# Patient Record
Sex: MALE | Race: WHITE | NOT HISPANIC OR LATINO | Employment: OTHER | URBAN - METROPOLITAN AREA
[De-identification: names, ages, dates, MRNs, and addresses within clinical notes are randomized per-mention and may not be internally consistent; named-entity substitution may affect disease eponyms.]

---

## 2021-07-21 ENCOUNTER — TELEPHONE (OUTPATIENT)
Dept: OPTOMETRY | Age: 66
End: 2021-07-21

## 2021-07-21 ENCOUNTER — OFFICE VISIT (OUTPATIENT)
Dept: OPTOMETRY | Age: 66
End: 2021-07-21

## 2021-07-21 DIAGNOSIS — H52.223 REGULAR ASTIGMATISM OF BOTH EYES: ICD-10-CM

## 2021-07-21 DIAGNOSIS — H52.03 HYPERMETROPIA OF BOTH EYES: ICD-10-CM

## 2021-07-21 DIAGNOSIS — H25.13 NUCLEAR AGE-RELATED CATARACT, BOTH EYES: Primary | ICD-10-CM

## 2021-07-21 DIAGNOSIS — H52.4 PRESBYOPIA: ICD-10-CM

## 2021-07-21 PROCEDURE — 92015 DETERMINE REFRACTIVE STATE: CPT | Performed by: OPTOMETRIST

## 2021-07-21 PROCEDURE — 92004 COMPRE OPH EXAM NEW PT 1/>: CPT | Performed by: OPTOMETRIST

## 2021-07-21 RX ORDER — QUETIAPINE FUMARATE 50 MG/1
25 TABLET, FILM COATED ORAL
COMMUNITY

## 2021-07-21 RX ORDER — SIMVASTATIN 40 MG
40 TABLET ORAL DAILY
COMMUNITY
Start: 2021-06-15

## 2021-07-21 RX ORDER — CYCLOBENZAPRINE HCL 10 MG
TABLET ORAL
COMMUNITY
Start: 2021-07-12

## 2021-07-21 RX ORDER — LEVOTHYROXINE SODIUM 0.03 MG/1
25 TABLET ORAL
COMMUNITY

## 2021-07-21 RX ORDER — ALPRAZOLAM 1 MG/1
TABLET ORAL
COMMUNITY
Start: 2021-06-29

## 2021-07-21 RX ORDER — PROPRANOLOL HYDROCHLORIDE 10 MG/1
TABLET ORAL
COMMUNITY
Start: 2021-07-13

## 2021-07-21 ASSESSMENT — VISUAL ACUITY
OS_CC+: -2
OD_CC+: +2
OS_CC: 20/40
METHOD: SNELLEN - LINEAR
OS_CC: J5@16
CORRECTION_TYPE: GLASSES
OD_CC: 20/40
OD_CC: J5@16

## 2021-07-21 ASSESSMENT — KERATOMETRY
OD_AXISANGLE_DEGREES: 090
OS_AXISANGLE_DEGREES: 090
OD_K1POWER_DIOPTERS: 45.00
OS_K2POWER_DIOPTERS: 46.37
OD_AXISANGLE2_DEGREES: 180
OS_AXISANGLE2_DEGREES: 180
OS_K1POWER_DIOPTERS: 44.75
OD_K2POWER_DIOPTERS: 46.37

## 2021-07-21 ASSESSMENT — TONOMETRY
OS_IOP_MMHG: 9
IOP_METHOD: APPLANATION
OD_IOP_MMHG: 9

## 2021-07-21 ASSESSMENT — REFRACTION_WEARINGRX
OD_CYLINDER: +0.50
OS_CYLINDER: +1.75
OD_SPHERE: +0.50
OD_ADD: +2.00
OS_ADD: +2.00
OD_AXIS: 095
OS_SPHERE: -1.75
OS_AXIS: 075

## 2021-07-21 ASSESSMENT — REFRACTION_MANIFEST
OD_SPHERE: PLANO
OD_AXIS: 090
OS_AXIS: 075
OS_ADD: +2.50
OD_ADD: +2.50
OS_SPHERE: -0.50
OD_CYLINDER: +0.50
OS_CYLINDER: +1.00

## 2021-07-21 ASSESSMENT — CUP TO DISC RATIO
OD_RATIO: 0.3
OS_RATIO: 0.3

## 2021-07-21 ASSESSMENT — EXTERNAL EXAM - RIGHT EYE: OD_EXAM: NORMAL

## 2021-07-21 ASSESSMENT — SLIT LAMP EXAM - LIDS
COMMENTS: PTOSIS
COMMENTS: PTOSIS

## 2021-07-21 ASSESSMENT — EXTERNAL EXAM - LEFT EYE: OS_EXAM: NORMAL

## 2021-07-21 ASSESSMENT — CONF VISUAL FIELD
OS_NORMAL: 1
OD_NORMAL: 1

## 2021-09-23 ENCOUNTER — OFFICE VISIT (OUTPATIENT)
Dept: OPHTHALMOLOGY | Age: 66
End: 2021-09-23

## 2021-09-23 DIAGNOSIS — H02.834 DERMATOCHALASIS OF BOTH UPPER EYELIDS: ICD-10-CM

## 2021-09-23 DIAGNOSIS — H25.13 CATARACT, NUCLEAR SCLEROTIC SENILE, BILATERAL: Primary | ICD-10-CM

## 2021-09-23 DIAGNOSIS — H02.831 DERMATOCHALASIS OF BOTH UPPER EYELIDS: ICD-10-CM

## 2021-09-23 PROCEDURE — 99204 OFFICE O/P NEW MOD 45 MIN: CPT | Performed by: OPHTHALMOLOGY

## 2021-09-27 ENCOUNTER — PREP FOR CASE (OUTPATIENT)
Dept: OPHTHALMOLOGY | Age: 66
End: 2021-09-27

## 2021-09-27 DIAGNOSIS — H25.11 NUCLEAR SENILE CATARACT OF RIGHT EYE: Primary | ICD-10-CM

## 2021-10-06 ENCOUNTER — OFFICE VISIT (OUTPATIENT)
Dept: OPHTHALMOLOGY | Age: 66
End: 2021-10-06

## 2021-10-06 DIAGNOSIS — H25.11 NUCLEAR SENILE CATARACT OF RIGHT EYE: Primary | ICD-10-CM

## 2021-10-06 PROCEDURE — 92136 OPHTHALMIC BIOMETRY: CPT | Performed by: OPHTHALMOLOGY

## 2021-10-06 RX ORDER — PROPARACAINE HYDROCHLORIDE 5 MG/ML
1 SOLUTION/ DROPS OPHTHALMIC SEE ADMIN INSTRUCTIONS
Status: CANCELLED | OUTPATIENT
Start: 2021-10-06

## 2021-10-06 RX ORDER — PHENYLEPHRINE HYDROCHLORIDE 100 MG/ML
1 SOLUTION/ DROPS OPHTHALMIC SEE ADMIN INSTRUCTIONS
Status: CANCELLED | OUTPATIENT
Start: 2021-10-06

## 2021-10-06 RX ORDER — TROPICAMIDE 10 MG/ML
1 SOLUTION/ DROPS OPHTHALMIC SEE ADMIN INSTRUCTIONS
Status: CANCELLED | OUTPATIENT
Start: 2021-10-06

## 2021-10-06 RX ORDER — MOXIFLOXACIN 5 MG/ML
1 SOLUTION/ DROPS OPHTHALMIC ONCE
Status: CANCELLED | OUTPATIENT
Start: 2021-10-06 | End: 2021-10-06

## 2021-10-14 ASSESSMENT — ACTIVITIES OF DAILY LIVING (ADL)
TRANSFERRING: NEEDS ASSISTANCE
CONTINENCE: INDEPENDENT
FEEDING: INDEPENDENT
BATHING: INDEPENDENT
TOILETING: INDEPENDENT
ADL_SCORE: 10
SENSORY_SUPPORT_DEVICES: DENTURES
DRESSING: NEEDS ASSISTANCE
NEEDS_ASSIST: YES

## 2021-10-17 ENCOUNTER — LAB SERVICES (OUTPATIENT)
Dept: LAB | Age: 66
End: 2021-10-17

## 2021-10-17 DIAGNOSIS — H25.11 NUCLEAR SENILE CATARACT OF RIGHT EYE: ICD-10-CM

## 2021-10-17 LAB
SARS-COV-2 RNA RESP QL NAA+PROBE: NOT DETECTED
SERVICE CMNT-IMP: NORMAL
SERVICE CMNT-IMP: NORMAL

## 2021-10-17 PROCEDURE — U0003 INFECTIOUS AGENT DETECTION BY NUCLEIC ACID (DNA OR RNA); SEVERE ACUTE RESPIRATORY SYNDROME CORONAVIRUS 2 (SARS-COV-2) (CORONAVIRUS DISEASE [COVID-19]), AMPLIFIED PROBE TECHNIQUE, MAKING USE OF HIGH THROUGHPUT TECHNOLOGIES AS DESCRIBED BY CMS-2020-01-R: HCPCS | Performed by: CLINICAL MEDICAL LABORATORY

## 2021-10-17 PROCEDURE — U0005 INFEC AGEN DETEC AMPLI PROBE: HCPCS | Performed by: CLINICAL MEDICAL LABORATORY

## 2021-10-20 ENCOUNTER — ANESTHESIA EVENT (OUTPATIENT)
Dept: SURGERY | Age: 66
End: 2021-10-20

## 2021-10-20 ENCOUNTER — ANESTHESIA (OUTPATIENT)
Dept: SURGERY | Age: 66
End: 2021-10-20

## 2021-10-20 ENCOUNTER — HOSPITAL ENCOUNTER (OUTPATIENT)
Age: 66
Discharge: HOME | End: 2021-10-20

## 2021-10-20 VITALS
HEART RATE: 70 BPM | BODY MASS INDEX: 27.78 KG/M2 | WEIGHT: 177 LBS | HEIGHT: 67 IN | DIASTOLIC BLOOD PRESSURE: 86 MMHG | TEMPERATURE: 97.6 F | RESPIRATION RATE: 20 BRPM | SYSTOLIC BLOOD PRESSURE: 127 MMHG | OXYGEN SATURATION: 95 %

## 2021-10-20 DIAGNOSIS — H25.11 NUCLEAR SENILE CATARACT OF RIGHT EYE: ICD-10-CM

## 2021-10-20 PROCEDURE — 66984 XCAPSL CTRC RMVL W/O ECP: CPT | Performed by: CLINIC/CENTER

## 2021-10-20 PROCEDURE — 66984 XCAPSL CTRC RMVL W/O ECP: CPT | Performed by: OPHTHALMOLOGY

## 2021-10-20 DEVICE — ACRYSOF(R) IQ ASPHERIC NATURAL IOL, SINGLE-PIECE ACRYLIC FOLDABLE PCL, UV WITH BLUE LIGHTFILTER, 13.0MM LENGTH, 6.0MM ANTERIORASYMMETRIC BICONVEX OPTIC, PLANAR HAPTICS.
Type: IMPLANTABLE DEVICE | Site: EYE | Status: FUNCTIONAL
Brand: ACRYSOF®

## 2021-10-20 RX ORDER — ONDANSETRON 2 MG/ML
4 INJECTION INTRAMUSCULAR; INTRAVENOUS 2 TIMES DAILY PRN
Status: DISCONTINUED | OUTPATIENT
Start: 2021-10-20 | End: 2021-10-22 | Stop reason: HOSPADM

## 2021-10-20 RX ORDER — BALANCED SALT SOLUTION 6.4; .75; .48; .3; 3.9; 1.7 MG/ML; MG/ML; MG/ML; MG/ML; MG/ML; MG/ML
SOLUTION OPHTHALMIC PRN
Status: DISCONTINUED | OUTPATIENT
Start: 2021-10-20 | End: 2021-10-22 | Stop reason: HOSPADM

## 2021-10-20 RX ORDER — SODIUM CHLORIDE 9 MG/ML
INJECTION, SOLUTION INTRAVENOUS CONTINUOUS
Status: DISCONTINUED | OUTPATIENT
Start: 2021-10-20 | End: 2021-10-22 | Stop reason: HOSPADM

## 2021-10-20 RX ORDER — MAGNESIUM HYDROXIDE 1200 MG/15ML
LIQUID ORAL PRN
Status: DISCONTINUED | OUTPATIENT
Start: 2021-10-20 | End: 2021-10-22 | Stop reason: HOSPADM

## 2021-10-20 RX ORDER — MOXIFLOXACIN 5 MG/ML
SOLUTION/ DROPS OPHTHALMIC PRN
Status: DISCONTINUED | OUTPATIENT
Start: 2021-10-20 | End: 2021-10-22 | Stop reason: HOSPADM

## 2021-10-20 RX ORDER — DEXTROSE MONOHYDRATE 50 MG/ML
INJECTION, SOLUTION INTRAVENOUS CONTINUOUS PRN
Status: DISCONTINUED | OUTPATIENT
Start: 2021-10-20 | End: 2021-10-22 | Stop reason: HOSPADM

## 2021-10-20 RX ORDER — LIDOCAINE HYDROCHLORIDE 10 MG/ML
INJECTION, SOLUTION EPIDURAL; INFILTRATION; INTRACAUDAL; PERINEURAL PRN
Status: DISCONTINUED | OUTPATIENT
Start: 2021-10-20 | End: 2021-10-22 | Stop reason: HOSPADM

## 2021-10-20 RX ORDER — SODIUM CHLORIDE, SODIUM LACTATE, POTASSIUM CHLORIDE, CALCIUM CHLORIDE 600; 310; 30; 20 MG/100ML; MG/100ML; MG/100ML; MG/100ML
INJECTION, SOLUTION INTRAVENOUS CONTINUOUS
Status: DISCONTINUED | OUTPATIENT
Start: 2021-10-20 | End: 2021-10-22 | Stop reason: HOSPADM

## 2021-10-20 RX ORDER — PHENYLEPHRINE HYDROCHLORIDE 100 MG/ML
1 SOLUTION/ DROPS OPHTHALMIC SEE ADMIN INSTRUCTIONS
Status: DISCONTINUED | OUTPATIENT
Start: 2021-10-20 | End: 2021-10-22 | Stop reason: HOSPADM

## 2021-10-20 RX ORDER — ACETAMINOPHEN 325 MG/1
650 TABLET ORAL EVERY 4 HOURS PRN
Status: CANCELLED | OUTPATIENT
Start: 2021-10-20

## 2021-10-20 RX ORDER — TRIAMCINOLONE ACETONIDE 40 MG/ML
INJECTION, SUSPENSION INTRA-ARTICULAR; INTRAMUSCULAR PRN
Status: DISCONTINUED | OUTPATIENT
Start: 2021-10-20 | End: 2021-10-22 | Stop reason: HOSPADM

## 2021-10-20 RX ORDER — TROPICAMIDE 10 MG/ML
1 SOLUTION/ DROPS OPHTHALMIC SEE ADMIN INSTRUCTIONS
Status: DISCONTINUED | OUTPATIENT
Start: 2021-10-20 | End: 2021-10-22 | Stop reason: HOSPADM

## 2021-10-20 RX ORDER — MOXIFLOXACIN 5 MG/ML
1 SOLUTION/ DROPS OPHTHALMIC ONCE
Status: COMPLETED | OUTPATIENT
Start: 2021-10-20 | End: 2021-10-20

## 2021-10-20 RX ORDER — LIDOCAINE HYDROCHLORIDE 10 MG/ML
5-10 INJECTION, SOLUTION INFILTRATION; PERINEURAL PRN
Status: DISCONTINUED | OUTPATIENT
Start: 2021-10-20 | End: 2021-10-22 | Stop reason: HOSPADM

## 2021-10-20 RX ORDER — PROPARACAINE HYDROCHLORIDE 5 MG/ML
1 SOLUTION/ DROPS OPHTHALMIC SEE ADMIN INSTRUCTIONS
Status: DISCONTINUED | OUTPATIENT
Start: 2021-10-20 | End: 2021-10-22 | Stop reason: HOSPADM

## 2021-10-20 RX ORDER — MIDAZOLAM HYDROCHLORIDE 1 MG/ML
INJECTION, SOLUTION INTRAMUSCULAR; INTRAVENOUS PRN
Status: DISCONTINUED | OUTPATIENT
Start: 2021-10-20 | End: 2021-10-20

## 2021-10-20 RX ADMIN — SODIUM CHLORIDE, SODIUM LACTATE, POTASSIUM CHLORIDE, CALCIUM CHLORIDE: 600; 310; 30; 20 INJECTION, SOLUTION INTRAVENOUS at 06:52

## 2021-10-20 RX ADMIN — TROPICAMIDE 1 DROP: 10 SOLUTION/ DROPS OPHTHALMIC at 06:32

## 2021-10-20 RX ADMIN — MIDAZOLAM HYDROCHLORIDE 2 MG: 1 INJECTION, SOLUTION INTRAMUSCULAR; INTRAVENOUS at 07:30

## 2021-10-20 RX ADMIN — PROPARACAINE HYDROCHLORIDE 1 DROP: 5 SOLUTION/ DROPS OPHTHALMIC at 07:22

## 2021-10-20 RX ADMIN — PHENYLEPHRINE HYDROCHLORIDE 1 DROP: 100 SOLUTION/ DROPS OPHTHALMIC at 06:32

## 2021-10-20 RX ADMIN — MOXIFLOXACIN 1 DROP: 5 SOLUTION/ DROPS OPHTHALMIC at 06:32

## 2021-10-20 RX ADMIN — PROPARACAINE HYDROCHLORIDE 1 DROP: 5 SOLUTION/ DROPS OPHTHALMIC at 06:32

## 2021-10-20 ASSESSMENT — PAIN SCALES - GENERAL
PAINLEVEL_OUTOF10: 0
PAINLEVEL_OUTOF10: 3

## 2021-10-20 ASSESSMENT — ENCOUNTER SYMPTOMS: EXERCISE TOLERANCE: GOOD (>4 METS)

## 2021-10-21 ENCOUNTER — PREP FOR CASE (OUTPATIENT)
Dept: OPHTHALMOLOGY | Age: 66
End: 2021-10-21

## 2021-10-21 ENCOUNTER — OFFICE VISIT (OUTPATIENT)
Dept: OPHTHALMOLOGY | Age: 66
End: 2021-10-21

## 2021-10-21 DIAGNOSIS — H25.12 NUCLEAR SENILE CATARACT OF LEFT EYE: Primary | ICD-10-CM

## 2021-10-21 DIAGNOSIS — Z96.1 PSEUDOPHAKIA, RIGHT EYE: Primary | ICD-10-CM

## 2021-10-21 PROCEDURE — 99024 POSTOP FOLLOW-UP VISIT: CPT | Performed by: OPHTHALMOLOGY

## 2021-10-21 RX ORDER — PHENYLEPHRINE HYDROCHLORIDE 100 MG/ML
1 SOLUTION/ DROPS OPHTHALMIC SEE ADMIN INSTRUCTIONS
Status: CANCELLED | OUTPATIENT
Start: 2021-10-21

## 2021-10-21 RX ORDER — TROPICAMIDE 10 MG/ML
1 SOLUTION/ DROPS OPHTHALMIC SEE ADMIN INSTRUCTIONS
Status: CANCELLED | OUTPATIENT
Start: 2021-10-21

## 2021-10-21 RX ORDER — PROPARACAINE HYDROCHLORIDE 5 MG/ML
1 SOLUTION/ DROPS OPHTHALMIC SEE ADMIN INSTRUCTIONS
Status: CANCELLED | OUTPATIENT
Start: 2021-10-21

## 2021-10-21 RX ORDER — MOXIFLOXACIN 5 MG/ML
1 SOLUTION/ DROPS OPHTHALMIC ONCE
Status: CANCELLED | OUTPATIENT
Start: 2021-10-21 | End: 2021-10-21

## 2021-10-27 ENCOUNTER — OFF PREMISE (OUTPATIENT)
Dept: OPHTHALMOLOGY | Age: 66
End: 2021-10-27

## 2021-10-27 DIAGNOSIS — H25.12 NUCLEAR SENILE CATARACT OF LEFT EYE: Primary | ICD-10-CM

## 2021-10-27 PROCEDURE — 92136 OPHTHALMIC BIOMETRY: CPT | Performed by: OPHTHALMOLOGY

## 2021-11-01 ENCOUNTER — OFFICE VISIT (OUTPATIENT)
Dept: OPHTHALMOLOGY | Age: 66
End: 2021-11-01

## 2021-11-01 DIAGNOSIS — Z96.1 PSEUDOPHAKIA, RIGHT EYE: Primary | ICD-10-CM

## 2021-11-01 PROCEDURE — 99024 POSTOP FOLLOW-UP VISIT: CPT | Performed by: OPHTHALMOLOGY

## 2021-11-01 RX ORDER — PREDNISOLONE ACETATE 10 MG/ML
1 SUSPENSION/ DROPS OPHTHALMIC 4 TIMES DAILY
Qty: 5 ML | Refills: 0 | Status: SHIPPED | OUTPATIENT
Start: 2021-11-01

## 2021-11-03 ASSESSMENT — ACTIVITIES OF DAILY LIVING (ADL)
SENSORY_SUPPORT_DEVICES: EYEGLASSES;DENTURES
ADL_SCORE: 12
ADL_BEFORE_ADMISSION: INDEPENDENT
MOBILITY_ASSIST_DEVICES: CANE;FOUR WHEEL WALKER;WHEELCHAIR

## 2021-11-05 ENCOUNTER — LAB SERVICES (OUTPATIENT)
Dept: LAB | Age: 66
End: 2021-11-05

## 2021-11-05 ENCOUNTER — TELEPHONE (OUTPATIENT)
Dept: OPHTHALMOLOGY | Age: 66
End: 2021-11-05

## 2021-11-05 DIAGNOSIS — H25.12 NUCLEAR SENILE CATARACT OF LEFT EYE: ICD-10-CM

## 2021-11-05 LAB
SARS-COV-2 RNA RESP QL NAA+PROBE: NOT DETECTED
SERVICE CMNT-IMP: NORMAL
SERVICE CMNT-IMP: NORMAL

## 2021-11-05 PROCEDURE — U0005 INFEC AGEN DETEC AMPLI PROBE: HCPCS | Performed by: CLINICAL MEDICAL LABORATORY

## 2021-11-05 PROCEDURE — U0003 INFECTIOUS AGENT DETECTION BY NUCLEIC ACID (DNA OR RNA); SEVERE ACUTE RESPIRATORY SYNDROME CORONAVIRUS 2 (SARS-COV-2) (CORONAVIRUS DISEASE [COVID-19]), AMPLIFIED PROBE TECHNIQUE, MAKING USE OF HIGH THROUGHPUT TECHNOLOGIES AS DESCRIBED BY CMS-2020-01-R: HCPCS | Performed by: CLINICAL MEDICAL LABORATORY

## 2021-11-05 RX ORDER — SODIUM CHLORIDE 9 MG/ML
INJECTION, SOLUTION INTRAVENOUS CONTINUOUS
Status: CANCELLED | OUTPATIENT
Start: 2021-11-05

## 2021-11-05 RX ORDER — SODIUM CHLORIDE, SODIUM LACTATE, POTASSIUM CHLORIDE, CALCIUM CHLORIDE 600; 310; 30; 20 MG/100ML; MG/100ML; MG/100ML; MG/100ML
INJECTION, SOLUTION INTRAVENOUS CONTINUOUS
Status: CANCELLED | OUTPATIENT
Start: 2021-11-05

## 2021-11-05 RX ORDER — DEXTROSE MONOHYDRATE 50 MG/ML
INJECTION, SOLUTION INTRAVENOUS CONTINUOUS PRN
Status: CANCELLED | OUTPATIENT
Start: 2021-11-05

## 2021-11-05 RX ORDER — LIDOCAINE HYDROCHLORIDE 10 MG/ML
5-10 INJECTION, SOLUTION INFILTRATION; PERINEURAL PRN
Status: CANCELLED | OUTPATIENT
Start: 2021-11-05

## 2021-11-05 RX ORDER — ONDANSETRON 2 MG/ML
4 INJECTION INTRAMUSCULAR; INTRAVENOUS 2 TIMES DAILY PRN
Status: CANCELLED | OUTPATIENT
Start: 2021-11-05

## 2021-11-08 ENCOUNTER — HOSPITAL ENCOUNTER (OUTPATIENT)
Age: 66
Discharge: HOME | End: 2021-11-08

## 2021-11-09 ENCOUNTER — APPOINTMENT (OUTPATIENT)
Dept: OPHTHALMOLOGY | Age: 66
End: 2021-11-09

## 2021-11-15 ENCOUNTER — TELEPHONE (OUTPATIENT)
Dept: OPHTHALMOLOGY | Age: 66
End: 2021-11-15

## 2021-11-19 ENCOUNTER — TELEPHONE (OUTPATIENT)
Dept: OPHTHALMOLOGY | Age: 66
End: 2021-11-19

## 2021-11-24 ENCOUNTER — HOSPITAL ENCOUNTER (OUTPATIENT)
Age: 66
Discharge: HOME | End: 2021-11-24
Payer: MEDICARE

## 2021-11-24 VITALS
BODY MASS INDEX: 29.35 KG/M2 | WEIGHT: 187 LBS | TEMPERATURE: 98 F | HEIGHT: 67 IN | DIASTOLIC BLOOD PRESSURE: 80 MMHG | RESPIRATION RATE: 20 BRPM | OXYGEN SATURATION: 99 % | HEART RATE: 77 BPM | SYSTOLIC BLOOD PRESSURE: 138 MMHG

## 2021-11-24 DIAGNOSIS — S81.811A LACERATION OF RIGHT LOWER EXTREMITY, INITIAL ENCOUNTER: Primary | ICD-10-CM

## 2021-11-24 PROCEDURE — 99203 OFFICE O/P NEW LOW 30 MIN: CPT | Performed by: NURSE PRACTITIONER

## 2021-11-24 PROCEDURE — 12002 RPR S/N/AX/GEN/TRNK2.6-7.5CM: CPT | Performed by: NURSE PRACTITIONER

## 2021-11-24 RX ORDER — CEPHALEXIN 500 MG/1
500 CAPSULE ORAL 3 TIMES DAILY
Qty: 15 CAPSULE | Refills: 0 | Status: SHIPPED | OUTPATIENT
Start: 2021-11-24 | End: 2021-11-29

## 2021-11-24 NOTE — ED PROVIDER NOTES
Patient Seen in: [facility]      History   Patient presents with:  Laceration/Abrasion    Stated Complaint: laceration    Subjective:   67-year-old male presents today with history of trip and fall with laceration to the right lower leg.   Admi reviewed. Constitutional:       Appearance: Normal appearance. HENT:      Head: Normocephalic. Nose: Nose normal.      Mouth/Throat:      Mouth: Mucous membranes are moist.   Cardiovascular:      Rate and Rhythm: Normal rate.    Pulmonary:      Eff for detailed discharge instructions for your condition today.                              Disposition and Plan     Clinical Impression:  Laceration of right lower extremity, initial encounter  (primary encounter diagnosis)     Disposition:  Discharge  11/2

## 2021-12-01 ENCOUNTER — PREP FOR CASE (OUTPATIENT)
Dept: OPHTHALMOLOGY | Age: 66
End: 2021-12-01

## 2021-12-01 ENCOUNTER — E-ADVICE (OUTPATIENT)
Dept: OPHTHALMOLOGY | Age: 66
End: 2021-12-01

## 2021-12-01 DIAGNOSIS — H25.12 NUCLEAR SENILE CATARACT OF LEFT EYE: Primary | ICD-10-CM

## 2021-12-01 RX ORDER — PHENYLEPHRINE HYDROCHLORIDE 100 MG/ML
1 SOLUTION/ DROPS OPHTHALMIC SEE ADMIN INSTRUCTIONS
Status: CANCELLED | OUTPATIENT
Start: 2021-12-01

## 2021-12-01 RX ORDER — MOXIFLOXACIN 5 MG/ML
1 SOLUTION/ DROPS OPHTHALMIC ONCE
Status: CANCELLED | OUTPATIENT
Start: 2021-12-01 | End: 2021-12-01

## 2021-12-01 RX ORDER — PROPARACAINE HYDROCHLORIDE 5 MG/ML
1 SOLUTION/ DROPS OPHTHALMIC SEE ADMIN INSTRUCTIONS
Status: CANCELLED | OUTPATIENT
Start: 2021-12-01

## 2021-12-01 RX ORDER — TROPICAMIDE 10 MG/ML
1 SOLUTION/ DROPS OPHTHALMIC SEE ADMIN INSTRUCTIONS
Status: CANCELLED | OUTPATIENT
Start: 2021-12-01

## 2021-12-10 ENCOUNTER — HOSPITAL ENCOUNTER (OUTPATIENT)
Age: 66
Discharge: HOME | End: 2021-12-10
Payer: MEDICARE

## 2021-12-10 VITALS
RESPIRATION RATE: 16 BRPM | DIASTOLIC BLOOD PRESSURE: 88 MMHG | HEART RATE: 95 BPM | SYSTOLIC BLOOD PRESSURE: 132 MMHG | TEMPERATURE: 98 F | OXYGEN SATURATION: 98 %

## 2021-12-10 DIAGNOSIS — Z48.02 ENCOUNTER FOR REMOVAL OF SUTURES: ICD-10-CM

## 2021-12-10 DIAGNOSIS — T14.8XXA WOUND INFECTION: ICD-10-CM

## 2021-12-10 DIAGNOSIS — L08.9 WOUND INFECTION: ICD-10-CM

## 2021-12-10 DIAGNOSIS — R21 LOCALIZED MACULAR RASH: Primary | ICD-10-CM

## 2021-12-10 PROCEDURE — 99213 OFFICE O/P EST LOW 20 MIN: CPT | Performed by: NURSE PRACTITIONER

## 2021-12-10 RX ORDER — CEPHALEXIN 500 MG/1
500 CAPSULE ORAL 3 TIMES DAILY
Qty: 30 CAPSULE | Refills: 0 | Status: SHIPPED | OUTPATIENT
Start: 2021-12-10 | End: 2021-12-20

## 2021-12-10 RX ORDER — PREDNISONE 10 MG/1
TABLET ORAL
Qty: 20 TABLET | Refills: 0 | Status: SHIPPED | OUTPATIENT
Start: 2021-12-10 | End: 2021-12-18

## 2021-12-10 NOTE — ED INITIAL ASSESSMENT (HPI)
Here for suture removal to right lower leg. Stitches had been put in 2 weeks ago. Rash to chest and back and arms for 3-4 weeks. Given medrol dose back 3 weeks ago, states was getting better and now has come back worse.

## 2021-12-10 NOTE — ED PROVIDER NOTES
Patient Seen in: [facility]      History   Patient presents with:  Rash  Sut Stap Gene    Stated Complaint: stitches removed /states he has a bad rash all over     Subjective:   HPI        Objective:   Past Medical History:   Preeti Joyce pain with palpation. Neurological:      Mental Status: He is alert and oriented to person, place, and time.                ED Course   Labs Reviewed - No data to display                MDM     Patient resents today with need of suture removal from the wou [id number]  [phone]  Schedule an appointment as soon as possible for a visit       Iram Suárez MD  [place]     In 1 week  for dermatology          Medications Prescribed:  Current Discharge Medica

## 2021-12-13 ENCOUNTER — TELEPHONE (OUTPATIENT)
Dept: OPHTHALMOLOGY | Age: 66
End: 2021-12-13

## 2021-12-15 ENCOUNTER — APPOINTMENT (OUTPATIENT)
Dept: ALLERGY | Age: 66
End: 2021-12-15

## 2022-02-11 ENCOUNTER — APPOINTMENT (OUTPATIENT)
Dept: LAB | Age: 67
End: 2022-02-11

## 2022-03-11 ENCOUNTER — APPOINTMENT (OUTPATIENT)
Dept: DERMATOLOGY | Age: 67
End: 2022-03-11

## 2022-04-12 ENCOUNTER — OFFICE VISIT (OUTPATIENT)
Dept: DERMATOLOGY | Age: 67
End: 2022-04-12

## 2022-04-12 DIAGNOSIS — L30.9 DERMATITIS: Primary | ICD-10-CM

## 2022-04-12 PROCEDURE — 99203 OFFICE O/P NEW LOW 30 MIN: CPT | Performed by: PHYSICIAN ASSISTANT

## 2022-04-12 RX ORDER — TRIAMCINOLONE ACETONIDE 1 MG/G
CREAM TOPICAL
Qty: 80 G | Refills: 1 | Status: SHIPPED | OUTPATIENT
Start: 2022-04-12 | End: 2022-09-14 | Stop reason: SDUPTHER

## 2022-05-03 ENCOUNTER — TELEPHONE (OUTPATIENT)
Dept: OPHTHALMOLOGY | Age: 67
End: 2022-05-03

## 2022-05-03 ENCOUNTER — PREP FOR CASE (OUTPATIENT)
Dept: OPHTHALMOLOGY | Age: 67
End: 2022-05-03

## 2022-05-18 ENCOUNTER — TELEPHONE (OUTPATIENT)
Dept: PHYSICAL THERAPY | Facility: HOSPITAL | Age: 67
End: 2022-05-18

## 2022-09-14 ENCOUNTER — OFFICE VISIT (OUTPATIENT)
Dept: DERMATOLOGY | Age: 67
End: 2022-09-14

## 2022-09-14 DIAGNOSIS — L30.9 DERMATITIS: ICD-10-CM

## 2022-09-14 PROCEDURE — 99214 OFFICE O/P EST MOD 30 MIN: CPT | Performed by: DERMATOLOGY

## 2022-09-14 RX ORDER — GABAPENTIN 300 MG/1
300 CAPSULE ORAL 2 TIMES DAILY
COMMUNITY
Start: 2022-08-05

## 2022-09-14 RX ORDER — TRIAMCINOLONE ACETONIDE 1 MG/G
CREAM TOPICAL
Qty: 454 G | Refills: 1 | Status: SHIPPED | OUTPATIENT
Start: 2022-09-14

## 2022-10-14 ENCOUNTER — TELEPHONE (OUTPATIENT)
Dept: OPHTHALMOLOGY | Age: 67
End: 2022-10-14

## 2022-11-11 ENCOUNTER — OFFICE VISIT (OUTPATIENT)
Dept: OPHTHALMOLOGY | Age: 67
End: 2022-11-11

## 2022-11-11 ENCOUNTER — PREP FOR CASE (OUTPATIENT)
Dept: OPHTHALMOLOGY | Age: 67
End: 2022-11-11

## 2022-11-11 DIAGNOSIS — H02.834 DERMATOCHALASIS OF BOTH UPPER EYELIDS: ICD-10-CM

## 2022-11-11 DIAGNOSIS — H25.12 NUCLEAR SCLEROTIC CATARACT OF LEFT EYE: Primary | ICD-10-CM

## 2022-11-11 DIAGNOSIS — H02.831 DERMATOCHALASIS OF BOTH UPPER EYELIDS: ICD-10-CM

## 2022-11-11 DIAGNOSIS — H25.12 CATARACT, NUCLEAR SCLEROTIC SENILE, LEFT: Primary | ICD-10-CM

## 2022-11-11 PROCEDURE — 99214 OFFICE O/P EST MOD 30 MIN: CPT | Performed by: OPHTHALMOLOGY

## 2022-11-15 RX ORDER — TROPICAMIDE 10 MG/ML
1 SOLUTION/ DROPS OPHTHALMIC SEE ADMIN INSTRUCTIONS
Status: CANCELLED | OUTPATIENT
Start: 2022-11-15

## 2022-11-15 RX ORDER — MOXIFLOXACIN 5 MG/ML
1 SOLUTION/ DROPS OPHTHALMIC ONCE
Status: CANCELLED | OUTPATIENT
Start: 2022-11-15 | End: 2022-11-15

## 2022-11-15 RX ORDER — PHENYLEPHRINE HYDROCHLORIDE 100 MG/ML
1 SOLUTION/ DROPS OPHTHALMIC SEE ADMIN INSTRUCTIONS
Status: CANCELLED | OUTPATIENT
Start: 2022-11-15

## 2022-11-15 RX ORDER — PROPARACAINE HYDROCHLORIDE 5 MG/ML
1 SOLUTION/ DROPS OPHTHALMIC SEE ADMIN INSTRUCTIONS
Status: CANCELLED | OUTPATIENT
Start: 2022-11-15

## 2022-12-06 ENCOUNTER — OFFICE VISIT (OUTPATIENT)
Dept: OPHTHALMOLOGY | Age: 67
End: 2022-12-06

## 2022-12-06 DIAGNOSIS — H25.12 CATARACT, NUCLEAR SCLEROTIC SENILE, LEFT: ICD-10-CM

## 2022-12-06 PROCEDURE — 92136 OPHTHALMIC BIOMETRY: CPT | Performed by: OPHTHALMOLOGY

## 2022-12-15 ENCOUNTER — EXTERNAL RECORD (OUTPATIENT)
Dept: HEALTH INFORMATION MANAGEMENT | Facility: OTHER | Age: 67
End: 2022-12-15

## 2023-01-04 ENCOUNTER — HOSPITAL ENCOUNTER (OUTPATIENT)
Age: 68
Discharge: HOME | End: 2023-01-04

## 2023-01-04 ENCOUNTER — ANESTHESIA EVENT (OUTPATIENT)
Dept: SURGERY | Age: 68
End: 2023-01-04

## 2023-01-04 ENCOUNTER — ANESTHESIA (OUTPATIENT)
Dept: SURGERY | Age: 68
End: 2023-01-04

## 2023-01-04 VITALS
HEIGHT: 66 IN | OXYGEN SATURATION: 97 % | BODY MASS INDEX: 30.53 KG/M2 | WEIGHT: 190 LBS | SYSTOLIC BLOOD PRESSURE: 119 MMHG | HEART RATE: 70 BPM | DIASTOLIC BLOOD PRESSURE: 69 MMHG | RESPIRATION RATE: 18 BRPM | TEMPERATURE: 97.3 F

## 2023-01-04 DIAGNOSIS — H25.12 NUCLEAR SCLEROTIC CATARACT OF LEFT EYE: ICD-10-CM

## 2023-01-04 PROCEDURE — 66984 XCAPSL CTRC RMVL W/O ECP: CPT | Performed by: OPHTHALMOLOGY

## 2023-01-04 PROCEDURE — 66984 XCAPSL CTRC RMVL W/O ECP: CPT | Performed by: CLINIC/CENTER

## 2023-01-04 DEVICE — ACRYSOF(R) IQ ASPHERIC NATURAL IOL, SINGLE-PIECE ACRYLIC FOLDABLE PCL, UV WITH BLUE LIGHTFILTER, 13.0MM LENGTH, 6.0MM ANTERIORASYMMETRIC BICONVEX OPTIC, PLANAR HAPTICS.
Type: IMPLANTABLE DEVICE | Site: EYE | Status: FUNCTIONAL
Brand: ACRYSOF®

## 2023-01-04 RX ORDER — BALANCED SALT SOLUTION 6.4; .75; .48; .3; 3.9; 1.7 MG/ML; MG/ML; MG/ML; MG/ML; MG/ML; MG/ML
SOLUTION OPHTHALMIC PRN
Status: DISCONTINUED | OUTPATIENT
Start: 2023-01-04 | End: 2023-01-06 | Stop reason: HOSPADM

## 2023-01-04 RX ORDER — PROPARACAINE HYDROCHLORIDE 5 MG/ML
1 SOLUTION/ DROPS OPHTHALMIC SEE ADMIN INSTRUCTIONS
Status: DISCONTINUED | OUTPATIENT
Start: 2023-01-04 | End: 2023-01-06 | Stop reason: HOSPADM

## 2023-01-04 RX ORDER — TROPICAMIDE 10 MG/ML
1 SOLUTION/ DROPS OPHTHALMIC SEE ADMIN INSTRUCTIONS
Status: DISCONTINUED | OUTPATIENT
Start: 2023-01-04 | End: 2023-01-06 | Stop reason: HOSPADM

## 2023-01-04 RX ORDER — ACETAMINOPHEN 325 MG/1
650 TABLET ORAL EVERY 4 HOURS PRN
Status: CANCELLED | OUTPATIENT
Start: 2023-01-04

## 2023-01-04 RX ORDER — PHENYLEPHRINE HCL - LIDOCAINE HCL 10; 15 MG/ML; MG/ML
INJECTION, SOLUTION INTRAOCULAR; OPHTHALMIC PRN
Status: DISCONTINUED | OUTPATIENT
Start: 2023-01-04 | End: 2023-01-06 | Stop reason: HOSPADM

## 2023-01-04 RX ORDER — SODIUM CHLORIDE 9 MG/ML
INJECTION, SOLUTION INTRAVENOUS CONTINUOUS
Status: DISCONTINUED | OUTPATIENT
Start: 2023-01-04 | End: 2023-01-06 | Stop reason: HOSPADM

## 2023-01-04 RX ORDER — PHENYLEPHRINE HYDROCHLORIDE 100 MG/ML
1 SOLUTION/ DROPS OPHTHALMIC SEE ADMIN INSTRUCTIONS
Status: DISCONTINUED | OUTPATIENT
Start: 2023-01-04 | End: 2023-01-06 | Stop reason: HOSPADM

## 2023-01-04 RX ORDER — SODIUM CHLORIDE, SODIUM LACTATE, POTASSIUM CHLORIDE, CALCIUM CHLORIDE 600; 310; 30; 20 MG/100ML; MG/100ML; MG/100ML; MG/100ML
INJECTION, SOLUTION INTRAVENOUS CONTINUOUS
Status: DISCONTINUED | OUTPATIENT
Start: 2023-01-04 | End: 2023-01-06 | Stop reason: HOSPADM

## 2023-01-04 RX ORDER — MIDAZOLAM HYDROCHLORIDE 1 MG/ML
INJECTION, SOLUTION INTRAMUSCULAR; INTRAVENOUS PRN
Status: DISCONTINUED | OUTPATIENT
Start: 2023-01-04 | End: 2023-01-04

## 2023-01-04 RX ORDER — LIDOCAINE HYDROCHLORIDE 10 MG/ML
5-10 INJECTION, SOLUTION EPIDURAL; INFILTRATION; INTRACAUDAL; PERINEURAL PRN
Status: DISCONTINUED | OUTPATIENT
Start: 2023-01-04 | End: 2023-01-06 | Stop reason: HOSPADM

## 2023-01-04 RX ORDER — MOXIFLOXACIN IN NACL,ISO-OS/PF 1.6 MG/ML
SYRINGE (ML) INTRAOCULAR PRN
Status: DISCONTINUED | OUTPATIENT
Start: 2023-01-04 | End: 2023-01-06 | Stop reason: HOSPADM

## 2023-01-04 RX ORDER — MAGNESIUM HYDROXIDE 1200 MG/15ML
LIQUID ORAL PRN
Status: DISCONTINUED | OUTPATIENT
Start: 2023-01-04 | End: 2023-01-06 | Stop reason: HOSPADM

## 2023-01-04 RX ORDER — MOXIFLOXACIN 5 MG/ML
1 SOLUTION/ DROPS OPHTHALMIC ONCE
Status: COMPLETED | OUTPATIENT
Start: 2023-01-04 | End: 2023-01-04

## 2023-01-04 RX ORDER — DEXTROSE MONOHYDRATE 50 MG/ML
INJECTION, SOLUTION INTRAVENOUS CONTINUOUS PRN
Status: DISCONTINUED | OUTPATIENT
Start: 2023-01-04 | End: 2023-01-06 | Stop reason: HOSPADM

## 2023-01-04 RX ADMIN — TROPICAMIDE 1 DROP: 10 SOLUTION/ DROPS OPHTHALMIC at 06:45

## 2023-01-04 RX ADMIN — PROPARACAINE HYDROCHLORIDE 1 DROP: 5 SOLUTION/ DROPS OPHTHALMIC at 07:49

## 2023-01-04 RX ADMIN — PROPARACAINE HYDROCHLORIDE 1 DROP: 5 SOLUTION/ DROPS OPHTHALMIC at 06:45

## 2023-01-04 RX ADMIN — MOXIFLOXACIN 1 DROP: 5 SOLUTION/ DROPS OPHTHALMIC at 06:45

## 2023-01-04 RX ADMIN — SODIUM CHLORIDE, SODIUM LACTATE, POTASSIUM CHLORIDE, CALCIUM CHLORIDE: 600; 310; 30; 20 INJECTION, SOLUTION INTRAVENOUS at 06:59

## 2023-01-04 RX ADMIN — PHENYLEPHRINE HYDROCHLORIDE 1 DROP: 100 SOLUTION/ DROPS OPHTHALMIC at 06:45

## 2023-01-04 RX ADMIN — PROPARACAINE HYDROCHLORIDE 1 DROP: 5 SOLUTION/ DROPS OPHTHALMIC at 08:26

## 2023-01-04 RX ADMIN — MIDAZOLAM HYDROCHLORIDE 2 MG: 1 INJECTION, SOLUTION INTRAMUSCULAR; INTRAVENOUS at 08:04

## 2023-01-04 SDOH — SOCIAL STABILITY: SOCIAL INSECURITY: RISK FACTORS: AGE

## 2023-01-04 SDOH — SOCIAL STABILITY: SOCIAL INSECURITY: RISK FACTORS: BMI> 30 (OBESITY)

## 2023-01-04 SDOH — SOCIAL STABILITY: SOCIAL INSECURITY: RISK FACTORS: HEART DISEASE

## 2023-01-04 ASSESSMENT — PAIN SCALES - GENERAL
PAINLEVEL_OUTOF10: 3
PAINLEVEL_OUTOF10: 5
PAINLEVEL_OUTOF10: 3

## 2023-01-04 ASSESSMENT — ENCOUNTER SYMPTOMS: EXERCISE TOLERANCE: GOOD (>4 METS)

## 2023-01-05 ENCOUNTER — OFFICE VISIT (OUTPATIENT)
Dept: OPHTHALMOLOGY | Age: 68
End: 2023-01-05

## 2023-01-05 DIAGNOSIS — Z96.1 PSEUDOPHAKIA, LEFT EYE: Primary | ICD-10-CM

## 2023-01-05 PROCEDURE — 99024 POSTOP FOLLOW-UP VISIT: CPT | Performed by: OPHTHALMOLOGY

## 2023-01-17 ENCOUNTER — APPOINTMENT (OUTPATIENT)
Dept: OPHTHALMOLOGY | Age: 68
End: 2023-01-17

## 2023-03-16 ENCOUNTER — EXTERNAL RECORD (OUTPATIENT)
Dept: HEALTH INFORMATION MANAGEMENT | Facility: OTHER | Age: 68
End: 2023-03-16

## 2023-06-08 DIAGNOSIS — G62.9 PERIPHERAL NERVE DISORDER: Primary | ICD-10-CM

## 2023-06-08 DIAGNOSIS — R26.89 UNSTABLE BALANCE: ICD-10-CM

## 2023-06-21 ENCOUNTER — OFFICE VISIT (OUTPATIENT)
Dept: PHYSICAL THERAPY | Age: 68
End: 2023-06-21

## 2023-06-21 DIAGNOSIS — R26.2 DIFFICULTY WALKING: ICD-10-CM

## 2023-06-21 DIAGNOSIS — M62.81 MUSCLE WEAKNESS: ICD-10-CM

## 2023-06-21 DIAGNOSIS — G62.9 PERIPHERAL NERVE DISORDER: Primary | ICD-10-CM

## 2023-06-21 DIAGNOSIS — R26.89 UNSTABLE BALANCE: ICD-10-CM

## 2023-06-21 PROCEDURE — 97112 NEUROMUSCULAR REEDUCATION: CPT | Performed by: PHYSICAL THERAPIST

## 2023-06-21 PROCEDURE — 97163 PT EVAL HIGH COMPLEX 45 MIN: CPT | Performed by: PHYSICAL THERAPIST

## 2023-06-21 ASSESSMENT — BALANCE ASSESSMENTS
STANDING UNSUPPORTED WITH EYES CLOSED: ABLE TO STAND 10 SECONDS WITH SUPERVISION
REACHING FORWARD WITH OUTSTRETCHED ARM WHILE STANDING: CAN REACH FORWARD >12 CM SAFELY (5 INCHES)
STANDING ON ONE LEG: UNABLE TO TRY OR NEEDS ASSIST TO PREVENT FALL
LONG VERSION TOTAL SCORE (MAX 56): 24
STANDING TO SITTING: CONTROLS DESCENT BY USING HANDS
TRANSFERS: NEEDS ONE PERSON TO ASSIST
STANDING UNSUPPORTED ONE FOOT IN FRONT: NEEDS HELP TO STEP BUT CAN HOLD 15 SECONDS
SITTING WITH FEET SUPPORTED AND BACK UNSUPPORTED: ABLE TO SIT SAFELY FOR 2 MINUTES
LOOK OVER LEFT AND RIGHT SHOULDERS WHILE STANDING: NEEDS SUPERVISION WHEN TURNING
TURN 360 DEGREES: NEEDS ASSISTANCE WHILE TURNING
PICK UP OBJECT FROM THE FLOOR FROM A STANDING POSITION: UNABLE TO TRY/NEEDS ASSIST TO KEEP FROM LOSING BALANCE OR FALLING
PLACE ALTERNATE FOOT ON STEP OR STOOL WHILE STANDING UNSUPPORTED: NEEDS ASSISTANCE TO KEEP FROM FALLING/UNABLE TO TRY
STANDING UNSUPPORTED: ABLE TO STAND SAFELY FOR 2 MINUTES
SITTING TO STANDING: ABLE TO STAND INDEPENDENTLY USING HANDS
STANDING UNSUPPORTED: NEEDS HELP TO ATTAIN POSITION BUT ABLE TO STAND 15 SECONDS WITH FEET TOGETHER

## 2023-06-21 ASSESSMENT — ENCOUNTER SYMPTOMS
PAIN SCALE AT LOWEST: 0
QUALITY: SHARP
QUALITY: ACHE
ALLEVIATING FACTORS: PRESCRIPTION MEDICATIONS
ALLEVIATING FACTORS: OVER-THE-COUNTER MEDICATION
PAIN SEVERITY NOW: 2
PAIN SCALE AT HIGHEST: 8

## 2023-06-27 ENCOUNTER — OFFICE VISIT (OUTPATIENT)
Dept: PHYSICAL THERAPY | Age: 68
End: 2023-06-27

## 2023-06-27 DIAGNOSIS — R26.89 UNSTABLE BALANCE: ICD-10-CM

## 2023-06-27 DIAGNOSIS — M62.81 MUSCLE WEAKNESS: ICD-10-CM

## 2023-06-27 DIAGNOSIS — R26.2 DIFFICULTY WALKING: ICD-10-CM

## 2023-06-27 DIAGNOSIS — G62.9 PERIPHERAL NERVE DISORDER: Primary | ICD-10-CM

## 2023-06-27 PROCEDURE — 97112 NEUROMUSCULAR REEDUCATION: CPT | Performed by: PHYSICAL THERAPIST

## 2023-07-05 ENCOUNTER — OFFICE VISIT (OUTPATIENT)
Dept: PHYSICAL THERAPY | Age: 68
End: 2023-07-05

## 2023-07-05 DIAGNOSIS — R26.89 UNSTABLE BALANCE: ICD-10-CM

## 2023-07-05 DIAGNOSIS — M62.81 MUSCLE WEAKNESS: ICD-10-CM

## 2023-07-05 DIAGNOSIS — G62.9 PERIPHERAL NERVE DISORDER: Primary | ICD-10-CM

## 2023-07-05 DIAGNOSIS — R26.2 DIFFICULTY WALKING: ICD-10-CM

## 2023-07-05 PROCEDURE — 97112 NEUROMUSCULAR REEDUCATION: CPT | Performed by: PHYSICAL THERAPIST

## 2023-07-11 ENCOUNTER — OFFICE VISIT (OUTPATIENT)
Dept: PHYSICAL THERAPY | Age: 68
End: 2023-07-11

## 2023-07-11 DIAGNOSIS — R26.89 UNSTABLE BALANCE: ICD-10-CM

## 2023-07-11 DIAGNOSIS — G62.9 PERIPHERAL NERVE DISORDER: Primary | ICD-10-CM

## 2023-07-11 DIAGNOSIS — M62.81 MUSCLE WEAKNESS: ICD-10-CM

## 2023-07-11 DIAGNOSIS — R26.2 DIFFICULTY WALKING: ICD-10-CM

## 2023-07-11 PROCEDURE — 97112 NEUROMUSCULAR REEDUCATION: CPT | Performed by: PHYSICAL THERAPIST

## 2023-07-19 ENCOUNTER — APPOINTMENT (OUTPATIENT)
Dept: PHYSICAL THERAPY | Age: 68
End: 2023-07-19

## 2023-08-09 ENCOUNTER — E-ADVICE (OUTPATIENT)
Dept: OTHER | Age: 68
End: 2023-08-09

## 2023-08-09 ASSESSMENT — MOVEMENT AND STRENGTH ASSESSMENTS
WALKING BETWEEN ROOMS: MODERATE DIFFICULTY
YOUR USUAL HOBBIES, RECREATIONAL OR SPORTING ACTIVIITIES: QUITE A BIT OF DIFFICULTY
PERFORMING HEAVY ACTIVITIES AROUND YOUR HOME: A LITTLE BIT OF DIFFICULTY
RUNNING ON UNEVEN GROUND: EXTREME DIFFICULTY OR UNABLE TO PERFORM ACTIVITY
GETTING INTO OR OUT OF A CAR: EXTREME DIFFICULTY OR UNABLE TO PERFORM ACTIVITY
GOING UP OR DOWN 10 STAIRS (ABOUT 1 FLIGHT OF STAIRS): QUITE A BIT OF DIFFICULTY
LIFTING AN OBJECT, LIKE A BAG OF GROCERIES, FROM THE FLOOR: EXTREME DIFFICULTY OR UNABLE TO PERFORM ACTIVITY
PUTTING ON YOUR SHOES OR SOCKS: EXTREME DIFFICULTY OR UNABLE TO PERFORM ACTIVITY
HOPPING: EXTREME DIFFICULTY OR UNABLE TO PERFORM ACTIVITY
SITTING FOR 1 HOUR: MODERATE DIFFICULTY
ANY OF YOUR USUAL WORK, HOUSEWORK OR SCHOOL ACTIVITIES: QUITE A BIT OF DIFFICULTY
ROLLING OVER IN BED: MODERATE DIFFICULTY
WALKING 2 BLOCKS: EXTREME DIFFICULTY OR UNABLE TO PERFORM ACTIVITY
TOTAL SCORE: 22.5
RUNNING ON EVEN GROUND: EXTREME DIFFICULTY OR UNABLE TO PERFORM ACTIVITY
STANDING FOR 1 HOUR: QUITE A BIT OF DIFFICULTY
WALKING A MILE: A LITTLE BIT OF DIFFICULTY
SQUATTING: EXTREME DIFFICULTY OR UNABLE TO PERFORM ACTIVITY
PERFORMING LIGHT ACTIVITES AROUND YOUR HOME: MODERATE DIFFICULTY
GETTING INTO OR OUT OF THE BATH: EXTREME DIFFICULTY OR UNABLE TO PERFORM ACTIVITY
MAKING SHARP TURNS WHILE RUNNING FAST: EXTREME DIFFICULTY OR UNABLE TO PERFORM ACTIVITY

## 2023-12-29 ENCOUNTER — TELEPHONE (OUTPATIENT)
Dept: OPHTHALMOLOGY | Age: 68
End: 2023-12-29

## 2024-04-30 ENCOUNTER — APPOINTMENT (OUTPATIENT)
Dept: OPTOMETRY | Age: 69
End: 2024-04-30

## 2024-05-03 ENCOUNTER — HOSPITAL ENCOUNTER (OUTPATIENT)
Age: 69
Discharge: HOME | End: 2024-05-03
Payer: MEDICARE

## 2024-05-03 ENCOUNTER — APPOINTMENT (OUTPATIENT)
Dept: ULTRASOUND IMAGING | Age: 69
End: 2024-05-03
Attending: NURSE PRACTITIONER
Payer: MEDICARE

## 2024-05-03 VITALS
SYSTOLIC BLOOD PRESSURE: 124 MMHG | DIASTOLIC BLOOD PRESSURE: 63 MMHG | RESPIRATION RATE: 20 BRPM | TEMPERATURE: 98 F | HEART RATE: 88 BPM | OXYGEN SATURATION: 94 %

## 2024-05-03 DIAGNOSIS — N45.3 EPIDIDYMO-ORCHITIS: Primary | ICD-10-CM

## 2024-05-03 LAB
GLUCOSE UR STRIP-MCNC: NEGATIVE MG/DL
KETONES UR STRIP-MCNC: 15 MG/DL
NITRITE UR QL STRIP: POSITIVE
PH UR STRIP: 6 [PH]
PROT UR STRIP-MCNC: 30 MG/DL
SP GR UR STRIP: 1.02
UROBILINOGEN UR STRIP-ACNC: <2 MG/DL

## 2024-05-03 PROCEDURE — 81002 URINALYSIS NONAUTO W/O SCOPE: CPT | Performed by: NURSE PRACTITIONER

## 2024-05-03 PROCEDURE — 76870 US EXAM SCROTUM: CPT | Performed by: NURSE PRACTITIONER

## 2024-05-03 PROCEDURE — 93975 VASCULAR STUDY: CPT | Performed by: NURSE PRACTITIONER

## 2024-05-03 PROCEDURE — 99214 OFFICE O/P EST MOD 30 MIN: CPT | Performed by: NURSE PRACTITIONER

## 2024-05-03 RX ORDER — LEVOFLOXACIN 500 MG/1
500 TABLET, FILM COATED ORAL DAILY
Qty: 10 TABLET | Refills: 0 | Status: SHIPPED | OUTPATIENT
Start: 2024-05-03 | End: 2024-05-13

## 2024-05-03 RX ORDER — QUETIAPINE FUMARATE 50 MG/1
25 TABLET, FILM COATED ORAL EVERY 12 HOURS
COMMUNITY

## 2024-05-03 RX ORDER — SIMVASTATIN 20 MG
20 TABLET ORAL NIGHTLY
COMMUNITY

## 2024-05-03 RX ORDER — TRAZODONE HYDROCHLORIDE 100 MG/1
100 TABLET ORAL NIGHTLY
COMMUNITY

## 2024-05-03 RX ORDER — DOXYCYCLINE HYCLATE 100 MG/1
100 CAPSULE ORAL 2 TIMES DAILY
COMMUNITY
Start: 2023-11-08

## 2024-05-03 RX ORDER — GABAPENTIN 300 MG/1
300 CAPSULE ORAL 2 TIMES DAILY
COMMUNITY
Start: 2022-08-05

## 2024-05-03 RX ORDER — TRIAMTERENE AND HYDROCHLOROTHIAZIDE 37.5; 25 MG/1; MG/1
1 CAPSULE ORAL DAILY
COMMUNITY
Start: 2024-01-24

## 2024-05-03 NOTE — DISCHARGE INSTRUCTIONS
Go directly to the ER with any fever, vomiting, abdominal pain, worsening of fatigue, or any worsening of your symptoms.  Follow-up with urology as soon as possible.

## 2024-05-03 NOTE — ED PROVIDER NOTES
Patient Seen in: [facility]      History     Chief Complaint   Patient presents with    Eval-G     Stated Complaint: eval- g infection    Subjective:   68-year-old male presents today with complaints of left testicular pain and swelling.  Denies any injuries.  Did have normal urination this morning.  Denies any pain with urination or frequency urgency.  Denies any fever or chills.  No abdominal back pain.  Patient is alert oriented x 3.  No other symptoms or concerns.  The patient's medication list, past medical history and social history elements as listed in today's nurse's notes were reviewed and agreed (except as otherwise stated in the HPI).  The patient's family history reviewed and determined to be noncontributory to the presenting problem            Objective:   No pertinent past medical history.            No pertinent past surgical history.              No pertinent social history.            Review of Systems    Positive for stated complaint: eval- g infection  Other systems are as noted in HPI.  Constitutional and vital signs reviewed.      All other systems reviewed and negative except as noted above.    Physical Exam     ED Triage Vitals [05/03/24 1154]   /63   Pulse 88   Resp 20   Temp 98 °F (36.7 °C)   Temp src Temporal   SpO2 94 %   O2 Device None (Room air)       Current:/63   Pulse 88   Temp 98 °F (36.7 °C) (Temporal)   Resp 20   SpO2 94%         Physical Exam  Vitals and nursing note reviewed.   Constitutional:       Appearance: Normal appearance.   HENT:      Head: Normocephalic.      Mouth/Throat:      Mouth: Mucous membranes are moist.   Cardiovascular:      Rate and Rhythm: Normal rate.   Pulmonary:      Effort: Pulmonary effort is normal.   Genitourinary:     Testes:         Left: Tenderness and swelling present.      Epididymis:      Left: Enlarged. Tenderness present.   Skin:     General: Skin is warm and dry.   Neurological:      Mental Status: He is alert and  oriented to person, place, and time.               ED Course     Labs Reviewed   [facility] POCT URINALYSIS DIPSTICK - Abnormal; Notable for the following components:       Result Value    Urine Color Orange (*)     Urine Clarity Cloudy (*)     Protein urine 30 (*)     Ketone, Urine 15 (*)     Bilirubin, Urine Small (*)     Blood, Urine Trace-Intact (*)     Nitrite Urine Positive (*)     Leukocyte esterase urine Moderate (*)     All other components within normal limits   URINE CULTURE, ROUTINE                  US SCROTUM W/ DOPPLER (CPT=93975/21474)    Result Date: 5/3/2024  PROCEDURE:  US SCROTUM W/ DOPPLER (CPT=93975/96715)  COMPARISON:  None.  INDICATIONS:  eval- g infection  TECHNIQUE:  Real time grey scale ultrasound was performed of the scrotal contents including the testicles, epididymis, spermatic cord, and scrotal wall. A duplex scan with B-mode, Doppler color flow, and spectral analysis were also performed.  PATIENT STATED HISTORY: (As transcribed by Technologist)  Left sided scrotal pain and swelling    FINDINGS:  TESTES:  Each testicle is identified in the respective hemiscrotum.  Testicular echotexture is diffusely inhomogeneous, similar bilaterally.  Contours are smooth.  Small areas of cystic change/hypoechogenicity likely related to tubular ectasia of the ready testes.  Right testicle 3.3 x 2.7 x 2.1 cm. Left 3.5 x 2.7 x 3.3 cm.  There is asymmetric color Doppler flow, increased on the left. EPIDIDYMIS:  The left epididymis is asymmetrically larger.  Head 3.2 cm versus right 1.8 cm.  There is also increased color Doppler flow to the left epididymis diffusely.  HYDROCELE:  Small on the left. VARICOCELE:  None. OTHER:  Likely bilateral fat containing inguinal hernias.             CONCLUSION:  1. Imaging findings consistent with left epididymal orchitis. 2. Focal areas in each testicle likely related to tubular ectasia of the rete testes.  Focused clinical/physical examination is recommended to ensure that  there is no palpable mass. 3. Likely reactive small left hydrocele. 4. Details as above.  Continued clinical correlation and follow-up recommended.    LOCATION:  [facility]    Dictated by (CST): Raul Elaine MD on 5/03/2024 at 12:50 PM     Finalized by (CST): Raul Elaine MD on 5/03/2024 at 12:55 PM         MDM     Please note that this report has been produced using speech recognition software and may contain errors related to that system including, but not limited to, errors in grammar, punctuation, and spelling, as well as words and phrases that possibly may have been recognized inappropriately.  If there are any questions or concerns, contact the dictating provider for clarification.        Note to patient: The 21st Century Cures Act makes medical notes like these available to patients in the interest of transparency. However, this is a medical document intended as peer to peer communication. It is written in medical language and may contain abbreviations or verbiage that are unfamiliar. It may appear blunt or direct. Medical documents are intended to carry relevant information, facts as evident, and the clinical opinion of the practitioner.                                   Medical Decision Making  Differential diagnosis includes but is not limited to: Orchitis, epididymitis, hydrocele, varicocele, hernia      Presents today with increased swelling pain to the left testicle.  Does have swelling noted to the testicle and epididymitis on exam.  Possible varicoceles on exam.  Urine dip was positive for nitrates and had large leukocytes.  Trace amount of blood was also noted.  Urine culture be sent to lab and is pending.  Ultrasound was done which does show epididymitis as well as orchitis.  Patient was noted to have bilateral fat-containing inguinal hernia.  Discussed patient with Dr. Suarez, attending of the IC, who instructed patient be placed on Levaquin daily for the next 10 days with close follow-up with urology.   Encourage patient take over-the-counter NSAID Tylenol for pain.  To push fluids.  Instructed patient to go directly to the ER with any worsening of symptoms.  Patient verbalized understanding and agreed plan of care.    Amount and/or Complexity of Data Reviewed  Labs: ordered. Decision-making details documented in ED Course.     Details: Urine dip  Urine culture  Radiology: ordered. Decision-making details documented in ED Course.     Details: Testicular ultrasound with Doppler    Risk  OTC drugs.  Prescription drug management.        Disposition and Plan     Clinical Impression:  1. Epididymo-orchitis         Disposition:  Discharge  5/3/2024  1:07 pm    Follow-up:  Kenji Morley MD  [address]  [address] [place] [address]  [phone]    Schedule an appointment as soon as possible for a visit             Medications Prescribed:  Current Discharge Medication List        START taking these medications    Details   levoFLOXacin 500 MG Oral Tab Take 1 tablet (500 mg total) by mouth daily for 10 days.  Qty: 10 tablet, Refills: 0

## (undated) DEVICE — GLOVE SURG 7 PROTEXIS LF CRM PF BEAD CUFF STRL PLISPRN 12IN

## (undated) DEVICE — Device

## (undated) DEVICE — GLOVE SURG 7 PROTEXIS LTX LIGHT BRN PF SMTH BEAD CUFF 3 PLY

## (undated) DEVICE — SYRINGE 1ML GRAD STRL MED LF DISP LL

## (undated) DEVICE — NEEDLE BLUNT 18GA 1.5IN REG WALL FILL LL HUB DEHP-FR STRL LF

## (undated) DEVICE — CANNULA OPHTHALMIC 27GA 8MM ANG VSCELAS INJ STRL DISP

## (undated) DEVICE — GLOVE SURG 6.5 PROTEXIS LF CRM PF BEAD CUFF STRL PLISPRN

## (undated) DEVICE — NEEDLE HPO 30GA .5IN REG WALL REG BVL LL HUB DEHP-FR STRL LF